# Patient Record
Sex: MALE | Race: WHITE | URBAN - METROPOLITAN AREA
[De-identification: names, ages, dates, MRNs, and addresses within clinical notes are randomized per-mention and may not be internally consistent; named-entity substitution may affect disease eponyms.]

---

## 2019-03-28 ENCOUNTER — APPOINTMENT (RX ONLY)
Dept: URBAN - METROPOLITAN AREA CLINIC 325 | Facility: CLINIC | Age: 74
Setting detail: DERMATOLOGY
End: 2019-03-28

## 2019-03-28 DIAGNOSIS — L29.89 OTHER PRURITUS: ICD-10-CM

## 2019-03-28 DIAGNOSIS — L85.3 XEROSIS CUTIS: ICD-10-CM

## 2019-03-28 DIAGNOSIS — L20.89 OTHER ATOPIC DERMATITIS: ICD-10-CM

## 2019-03-28 DIAGNOSIS — L29.8 OTHER PRURITUS: ICD-10-CM

## 2019-03-28 PROBLEM — L20.84 INTRINSIC (ALLERGIC) ECZEMA: Status: ACTIVE | Noted: 2019-03-28

## 2019-03-28 PROBLEM — L55.1 SUNBURN OF SECOND DEGREE: Status: ACTIVE | Noted: 2019-03-28

## 2019-03-28 PROCEDURE — ? COUNSELING

## 2019-03-28 PROCEDURE — ? ADDITIONAL NOTES

## 2019-03-28 PROCEDURE — 99203 OFFICE O/P NEW LOW 30 MIN: CPT

## 2019-03-28 PROCEDURE — ? PRESCRIPTION

## 2019-03-28 RX ORDER — HALOBETASOL PROPIONATE 0.5 MG/G
OINTMENT TOPICAL
Qty: 1 | Refills: 1 | Status: ERX | COMMUNITY
Start: 2019-03-28

## 2019-03-28 RX ORDER — PIMECROLIMUS 10 MG/G
CREAM TOPICAL
Qty: 1 | Refills: 2 | Status: ERX | COMMUNITY
Start: 2019-03-28

## 2019-03-28 RX ADMIN — HALOBETASOL PROPIONATE: 0.5 OINTMENT TOPICAL at 16:33

## 2019-03-28 RX ADMIN — PIMECROLIMUS: 10 CREAM TOPICAL at 16:32

## 2019-03-28 ASSESSMENT — LOCATION DETAILED DESCRIPTION DERM
LOCATION DETAILED: LEFT PROXIMAL PRETIBIAL REGION
LOCATION DETAILED: LEFT PROXIMAL DORSAL FOREARM
LOCATION DETAILED: RIGHT PROXIMAL DORSAL FOREARM
LOCATION DETAILED: RIGHT DISTAL PRETIBIAL REGION

## 2019-03-28 ASSESSMENT — BSA ECZEMA: % BODY COVERED IN ECZEMA: 20

## 2019-03-28 ASSESSMENT — LOCATION SIMPLE DESCRIPTION DERM
LOCATION SIMPLE: LEFT PRETIBIAL REGION
LOCATION SIMPLE: RIGHT PRETIBIAL REGION
LOCATION SIMPLE: RIGHT FOREARM
LOCATION SIMPLE: LEFT FOREARM

## 2019-03-28 ASSESSMENT — SEVERITY ASSESSMENT: SEVERITY: MODERATE

## 2019-03-28 ASSESSMENT — LOCATION ZONE DERM
LOCATION ZONE: ARM
LOCATION ZONE: LEG

## 2019-03-28 NOTE — HPI: RASH (ECZEMA)
How Severe Is Your Eczema?: moderate
Is This A New Presentation, Or A Follow-Up?: Rash
Additional History: Patient has used cortisone shots to relieve Eczema and prednisone but states the improvement is only temporary.  He uses Eucrisa and he believes it helps. He was referred by Dr Mattson to discuss possible patch testing